# Patient Record
Sex: MALE | Employment: OTHER | URBAN - METROPOLITAN AREA
[De-identification: names, ages, dates, MRNs, and addresses within clinical notes are randomized per-mention and may not be internally consistent; named-entity substitution may affect disease eponyms.]

---

## 2017-01-24 ENCOUNTER — TELEPHONE (OUTPATIENT)
Dept: UROLOGY | Facility: CLINIC | Age: 47
End: 2017-01-24

## 2017-01-24 NOTE — TELEPHONE ENCOUNTER
----- Message from Marnie Jose sent at 1/24/2017 10:31 AM CST -----  Contact: pt  _  1st Request  _  2nd Request  _  3rd Request        Who: pt    Why: pt wants to reschedule his vasectomy that was for today.    What Number to Call Back:[phone]    When to Expect a call back: (Before the end of the day)   -- if call after 3:00 call back will be tomorrow.

## 2017-03-09 ENCOUNTER — TELEPHONE (OUTPATIENT)
Dept: UROLOGY | Facility: CLINIC | Age: 47
End: 2017-03-09

## 2017-03-09 NOTE — TELEPHONE ENCOUNTER
Spoke with pt he states that he had an interview and was unable to make his scheduled procedure. Pt states he was unable to reschedule at time but he will call back to reschedule.

## 2019-03-20 ENCOUNTER — OFFICE VISIT (OUTPATIENT)
Dept: URGENT CARE | Facility: CLINIC | Age: 49
End: 2019-03-20
Payer: OTHER MISCELLANEOUS

## 2019-03-20 VITALS
TEMPERATURE: 100 F | RESPIRATION RATE: 18 BRPM | BODY MASS INDEX: 30.8 KG/M2 | DIASTOLIC BLOOD PRESSURE: 75 MMHG | SYSTOLIC BLOOD PRESSURE: 134 MMHG | WEIGHT: 220 LBS | OXYGEN SATURATION: 99 % | HEIGHT: 71 IN | HEART RATE: 106 BPM

## 2019-03-20 DIAGNOSIS — T78.40XA ALLERGIC REACTION, INITIAL ENCOUNTER: Primary | ICD-10-CM

## 2019-03-20 DIAGNOSIS — L50.9 HIVES OF UNKNOWN ORIGIN: ICD-10-CM

## 2019-03-20 DIAGNOSIS — Z56.89 WORK-RELATED CONDITION: ICD-10-CM

## 2019-03-20 PROCEDURE — 99204 OFFICE O/P NEW MOD 45 MIN: CPT | Mod: 25,S$GLB,, | Performed by: SURGERY

## 2019-03-20 PROCEDURE — 96372 PR INJECTION,THERAP/PROPH/DIAG2ST, IM OR SUBCUT: ICD-10-PCS | Mod: S$GLB,,, | Performed by: SURGERY

## 2019-03-20 PROCEDURE — 99204 PR OFFICE/OUTPT VISIT, NEW, LEVL IV, 45-59 MIN: ICD-10-PCS | Mod: 25,S$GLB,, | Performed by: SURGERY

## 2019-03-20 PROCEDURE — 96372 THER/PROPH/DIAG INJ SC/IM: CPT | Mod: S$GLB,,, | Performed by: SURGERY

## 2019-03-20 RX ORDER — CETIRIZINE HYDROCHLORIDE 10 MG/1
10 TABLET ORAL DAILY
Qty: 30 TABLET | Refills: 0 | Status: SHIPPED | OUTPATIENT
Start: 2019-03-20 | End: 2020-03-19

## 2019-03-20 RX ORDER — DIPHENHYDRAMINE HYDROCHLORIDE 50 MG/ML
50 INJECTION INTRAMUSCULAR; INTRAVENOUS
Status: COMPLETED | OUTPATIENT
Start: 2019-03-20 | End: 2019-03-20

## 2019-03-20 RX ORDER — DIPHENHYDRAMINE HCL 50 MG
50 CAPSULE ORAL EVERY 4 HOURS PRN
Qty: 30 CAPSULE | Refills: 0 | Status: SHIPPED | OUTPATIENT
Start: 2019-03-20

## 2019-03-20 RX ORDER — METHYLPREDNISOLONE 4 MG/1
TABLET ORAL
Qty: 1 PACKAGE | Refills: 0 | Status: SHIPPED | OUTPATIENT
Start: 2019-03-20

## 2019-03-20 RX ADMIN — DIPHENHYDRAMINE HYDROCHLORIDE 50 MG: 50 INJECTION INTRAMUSCULAR; INTRAVENOUS at 08:03

## 2019-03-20 NOTE — LETTER
[facility] [place] [address], [place] [address]  Phone: [phone]  Fax: [phone]  [facility]: [phone]    Pt Name: Chin Jaime Date: 03/20/2019   Employee ID:  Date of First Treatment: 03/20/2019   Company: [employer] to [employer]       Appointment Time: 07:25 PM Arrived: 7:55   Provider: [facility] Time Out:8:41     Office Treatment:   1. Allergic reaction, initial encounter      Medications Ordered This Encounter   Medications    cetirizine (ZYRTEC) 10 MG tablet    diphenhydrAMINE (BENADRYL) 50 MG capsule    diphenhydrAMINE injection 50 mg IM in clinic    methylPREDNISolone (MEDROL DOSEPACK) 4 mg tablet    methylPREDNISolone sod suc(PF) injection 125 mg IM in clinic    ranitidine (ZANTAC) 150 MG tablet        Return to work 3/22/2019 with no restrictions, full duty. Do not take benadryl prior to driving or working. Finish steroid pack, continue ranitidine and cetirizine.            Return Appointment: Follow up as needed with [facility]       Dr Sharita Traore MD

## 2019-03-21 NOTE — PATIENT INSTRUCTIONS
General Allergic Reactions  An allergic reaction is a set of symptoms caused by an allergen. An allergen is something that causes a persons immune system to react. When a person comes in contact with an allergen, it causes the body to release chemicals. These include the chemical histamine. Histamine causes swelling and itching. It may affect the entire body. This is called a general allergic reaction. Often symptoms affect only 1 part of the body. This is called a local allergic reaction.  You are having an allergic reaction. Almost anything can cause one. Different people are allergic to different things. It is usually something that you ate or swallowed, came into contact with by getting or putting it on your skin or clothes, or something you breathed in the air. This can be very annoying and sometimes scary.  Most of us think of allergic reactions when we have a rash or itchy skin. Symptoms can include:  · Itching of the eyes, nose, and roof of the mouth  · Runny or stuffy nose  · Watery eyes   · Sneezing or coughing   · A blocked feeling in the ear  · Red, itchy rash called hives  · Red and purple spots  · Rash, redness, welts, blisters  · Itching, burning, stinging, pain  · Dry, flaky, cracking, scaly skin  Severe symptoms include:  · Swelling of the face, lips, or other parts of the body  · Hoarse voice  · Trouble swallowing, feeling like your throat is closing  · Trouble breathing, wheezing  · Nausea, vomiting, diarrhea, stomach cramps  · Feeling faint or lightheaded, rapid heart rate  Sometimes the cause may be obvious. But there are so many things that can cause a reaction that you may not be able to figure out. The most important things to help find your allergen are:  · Remembering when it started  · What you were doing at the time or just before that  · Any activities you were involved in  · Any new products or contacts  Below are some common causes. But remember that almost anything can cause a  reaction. You may not even be aware that you came into contact with one of these things:  · Dust, mold, pollen  · Plants (common ones are poison ivy and poison oak, but there are many others)   · Animals  · Foods such as shrimp, shellfish, peanuts, milk products, gluten, and eggs. Also food colorings, flavorings, and additives.  · Insect bites or stings such as bees, mosquitos, fleas, ticks  · Medicines such as penicillin, sulfa medicines, amoxicillin, aspirin, and ibuprofen. But any medicine can cause a reaction.  · Jewelry such as nickel or gold. This can be new, or something youve worn for a while, including zippers and buttons.  · Latex such as in gloves, clothes, toys, balloons, or some tapes. Some people allergic to latex may also have problems with foods like bananas, avocados, kiwi, papaya, or chestnuts.  · Lotions, perfumes, cosmetics, soaps, shampoos, skincare products, nail products  · Chemicals or dyes in clothing, linen, , hair dyes, soaps, iodine  Many viruses and common colds can cause a rash that is not an allergic reaction. Sometimes it is hard to tell the difference between allergies, sensitivity, or an intolerance to something. This is especially true with food. Many things can cause diarrhea, vomiting, stomach cramps, and skin irritation.  Home care    The goal of treatment is to help relieve the symptoms and get you feeling better. The rash will usually fade over several days. But it can sometimes last a couple of weeks. Over the next couple of days, there may be times when it is gets a little worse, and then better again. Here are some things to do:  · If you know what you are allergic to, stay away from it. Future reactions could be worse than this one.  · Avoid tight clothing and anything that heats up your skin (hot showers or baths, direct sunlight). Heat will make itching worse.  · An ice pack will relieve local areas of intense itching and redness. To make an ice pack, put ice  cubes in a plastic bag that seals at the top. Wrap it in a thin, clean towel. Dont put the ice directly on the skin because it can damage the skin.  · Oral diphenhydramine is an over-the-counter antihistamine sold at pharmacy and grocery stores. Unless a prescription antihistamine was given, diphenhydramine may be used to reduce itching if large areas of the skin are involved. It may make you sleepy. So be careful using it in the daytime or when going to school, working, or driving. Note: Dont use diphenhydramine if you have glaucoma or if you are a man with trouble urinating due to an enlarged prostate. There are other antihistamines that wont make you so sleepy. These are good choices for daytime use. Ask your pharmacist for suggestions.  · Dont use diphenhydramine cream on your skin. It can cause a further reaction in some people.  · To help prevent an infection, don't scratch the affected area. Scratching may worsen the reaction and damage your skin. It can also lead to an infection. Always check the affected for signs of an infection.  · Call your healthcare provider and ask what you can use to help decrease the itching.  · To decrease allergic reactions, try the following:    · Use heat-steam to clean your home  · Use high-efficiency particulate (HEPA) vacuums and filters  · Stay away from food and pet triggers  · Kill any cockroaches  · Clean your house often  Follow-up care  Follow up with your healthcare provider, or as advised. If you had a severe reaction today, or if you have had several mild to medium allergic reactions in the past, ask your provider about allergy testing. This can help you find out what you are allergic to. If your reaction included dizziness, fainting, or trouble breathing or swallowing, ask your provider about carrying auto-injectable epinephrine.  Call 911  Call 911 if any of these occur:  · Trouble breathing or swallowing, wheezing  · Cool, moist, pale skin  · Shortness of  breath  · Hoarse voice or trouble speaking  · Confused   · Very drowsy or trouble awakening  · Fainting or loss of consciousness  · Rapid heart rate  · Feeling of dizziness or weakness or a sudden drop in blood pressure  · Feeling of doom  · Feeling lightheaded  · Severe nausea or vomiting, or diarrhea  · Seizure  · Swelling in the face, eyelids, lips, mouth, throat or tongue  · Drooling  When to seek medical advice  Call your healthcare provider right away if any of these occur:  · Spreading areas of itching, redness or swelling  · Nausea or stomach cramps or abdominal pain  · Continuing or recurring symptoms  · Spreading areas of redness, swelling, or itching  · Signs of infection at the affected site:  ¨ Spreading redness  ¨ Increased pain or swelling  ¨ Fluid or colored drainage from the site  ¨ Fever of 100.4°F (38°C) or above lasting for 24 to 48 hours, or as directed by your provider  Date Last Reviewed: 3/1/2017  © 2751-6960 [employer]. [address] [place] [address], [place] [address]. All rights reserved. This information is not intended as a substitute for professional medical care. Always follow your healthcare professional's instructions.

## 2019-03-21 NOTE — PROGRESS NOTES
"Subjective:       Patient ID: Chin Birch is a 48 y.o. male.    Vitals:  height is 5' 11" (1.803 m) and weight is 99.8 kg (220 lb). His temperature is 100 °F (37.8 °C). His blood pressure is 134/75 and his pulse is 106. His respiration is 18 and oxygen saturation is 99%.     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  Felt that his skin was developing swelling and a rash as well.  He denied wheezing or shortness of breath although he felt that his throat was tight and that his face is was somewhat numb feeling.       Allergic Reaction   This is a new problem. The current episode started today. The problem has been gradually worsening since onset. The problem is moderate. It is unknown what he was exposed to. The exposure occurred at work. Associated symptoms include a rash. Pertinent negatives include no chest pain, coughing, diarrhea or vomiting. Past treatments include nothing. The treatment provided no relief. There is no history of seasonal allergies. There is no swelling present.       Constitution: Negative for chills, fatigue and fever.   HENT: Negative for congestion and sore throat.    Neck: Negative for painful lymph nodes.   Cardiovascular: Negative for chest pain and leg swelling.   Eyes: Negative for double vision and blurred vision.   Respiratory: Negative for cough and shortness of breath.    Gastrointestinal: Negative for nausea, vomiting and diarrhea.   Genitourinary: Negative for dysuria, frequency and urgency.   Musculoskeletal: Negative for joint pain, joint swelling, muscle cramps and muscle ache.   Skin: Positive for rash and hives. Negative for color change, pale and erythema.   Allergic/Immunologic: Positive for hives. Negative for seasonal allergies. "   Neurological: Negative for dizziness, history of vertigo, light-headedness, passing out and headaches.   Hematologic/Lymphatic: Negative for swollen lymph nodes, easy bruising/bleeding and history of blood clots. Does not bruise/bleed easily.   Psychiatric/Behavioral: Negative for nervous/anxious, sleep disturbance and depression. The patient is not nervous/anxious.        Objective:      Physical Exam   Constitutional: He is oriented to person, place, and time. He appears well-developed and well-nourished.   HENT:   Head: Normocephalic and atraumatic. Head is without abrasion, without contusion and without laceration.   Right Ear: External ear normal.   Left Ear: External ear normal.   Nose: Nose normal.   Mouth/Throat: Oropharynx is clear and moist.   Eyes: Conjunctivae, EOM and lids are normal. Pupils are equal, round, and reactive to light.   Neck: Trachea normal, full passive range of motion without pain and phonation normal. Neck supple.   Cardiovascular: Normal rate, regular rhythm and normal heart sounds.   Pulmonary/Chest: Effort normal and breath sounds normal. No stridor. No respiratory distress.   Musculoskeletal: Normal range of motion.   Neurological: He is alert and oriented to person, place, and time.   Skin: Skin is warm, dry and intact. Capillary refill takes less than 2 seconds. Rash noted. No abrasion, no bruising, no burn, no ecchymosis, no laceration and no lesion noted. Rash is maculopapular. No erythema.   Diffuse pruritis and erythema, worse on face, chest and arms. Irregular papules on inner arms.    Psychiatric: He has a normal mood and affect. His speech is normal and behavior is normal. Judgment and thought content normal. Cognition and memory are normal.   Nursing note and vitals reviewed.        Noted receding of some of the bright erythema of his chest.  Lessening the itching.  Patient more comfortable and not in distress after steroid and Benadryl injection.  Assessment:       1.  Allergic reaction, initial encounter    2. Hives of unknown origin        Plan:         Allergic reaction, initial encounter  -     methylPREDNISolone sod suc(PF) injection 125 mg  -     diphenhydrAMINE injection 50 mg  -     methylPREDNISolone (MEDROL DOSEPACK) 4 mg tablet; use as directed  Dispense: 1 Package; Refill: 0  -     diphenhydrAMINE (BENADRYL) 50 MG capsule; Take 1 capsule (50 mg total) by mouth every 4 (four) hours as needed for Itching (rash/allergic reaction).  Dispense: 30 capsule; Refill: 0  -     cetirizine (ZYRTEC) 10 MG tablet; Take 1 tablet (10 mg total) by mouth once daily.  Dispense: 30 tablet; Refill: 0  -     ranitidine (ZANTAC) 150 MG tablet; Take 1 tablet (150 mg total) by mouth 2 (two) times daily. Take with diclofenac to prevent stomach ulcers and gastritis  Dispense: 60 tablet; Refill: 0    Hives of unknown origin    Return to full duty Friday 3/22/2019. Do not take benadryl before going to work or before driving.       The patient called his sister for a ride home and was discharged once improved.

## 2024-08-11 ENCOUNTER — HOSPITAL ENCOUNTER (EMERGENCY)
Facility: HOSPITAL | Age: 54
Discharge: HOME | End: 2024-08-11
Attending: EMERGENCY MEDICINE

## 2024-08-11 VITALS
HEART RATE: 74 BPM | HEIGHT: 71 IN | TEMPERATURE: 98 F | WEIGHT: 260 LBS | RESPIRATION RATE: 18 BRPM | OXYGEN SATURATION: 98 % | DIASTOLIC BLOOD PRESSURE: 77 MMHG | SYSTOLIC BLOOD PRESSURE: 128 MMHG | BODY MASS INDEX: 36.4 KG/M2

## 2024-08-11 DIAGNOSIS — M79.672 LEFT FOOT PAIN: ICD-10-CM

## 2024-08-11 DIAGNOSIS — L03.90 CELLULITIS, UNSPECIFIED CELLULITIS SITE: Primary | ICD-10-CM

## 2024-08-11 DIAGNOSIS — M79.675 TOE PAIN, LEFT: ICD-10-CM

## 2024-08-11 DIAGNOSIS — R07.9 CHEST PAIN: ICD-10-CM

## 2024-08-11 LAB
ALBUMIN SERPL-MCNC: 4 G/DL (ref 3.3–5.5)
ALP SERPL-CCNC: 57 U/L (ref 42–141)
BILIRUB SERPL-MCNC: 1.3 MG/DL (ref 0.2–1.6)
BUN SERPL-MCNC: 14 MG/DL (ref 7–22)
CALCIUM SERPL-MCNC: 10 MG/DL (ref 8–10.3)
CHLORIDE SERPL-SCNC: 105 MMOL/L (ref 98–108)
CREAT SERPL-MCNC: 1.4 MG/DL (ref 0.6–1.2)
GLUCOSE SERPL-MCNC: 104 MG/DL (ref 73–118)
HCT, POC: NORMAL
HGB, POC: NORMAL (ref 14–18)
MCH, POC: NORMAL
MCHC, POC: NORMAL
MCV, POC: NORMAL
MPV, POC: NORMAL
OHS QRS DURATION: 88 MS
OHS QTC CALCULATION: 421 MS
POC ALT (SGPT): 50 U/L (ref 10–47)
POC AST (SGOT): 51 U/L (ref 11–38)
POC D-DI: 545 NG/ML (ref 0–450)
POC PLATELET COUNT: NORMAL
POC PTINR: 1.1 (ref 0.9–1.2)
POC PTWBT: 13.4 SEC (ref 9.7–14.3)
POC TCO2: 31 MMOL/L (ref 18–33)
POTASSIUM BLD-SCNC: 4.1 MMOL/L (ref 3.6–5.1)
PROTEIN, POC: 7.4 G/DL (ref 6.4–8.1)
RBC, POC: NORMAL
RDW, POC: NORMAL
SAMPLE: NORMAL
SODIUM BLD-SCNC: 140 MMOL/L (ref 128–145)
WBC, POC: NORMAL

## 2024-08-11 PROCEDURE — 85025 COMPLETE CBC W/AUTO DIFF WBC: CPT | Mod: ER

## 2024-08-11 PROCEDURE — 80053 COMPREHEN METABOLIC PANEL: CPT | Mod: ER

## 2024-08-11 PROCEDURE — 93005 ELECTROCARDIOGRAM TRACING: CPT | Mod: ER

## 2024-08-11 PROCEDURE — 93010 ELECTROCARDIOGRAM REPORT: CPT | Mod: ,,, | Performed by: INTERNAL MEDICINE

## 2024-08-11 PROCEDURE — 99285 EMERGENCY DEPT VISIT HI MDM: CPT | Mod: 25,ER

## 2024-08-11 RX ORDER — IBUPROFEN 600 MG/1
600 TABLET ORAL EVERY 6 HOURS
Qty: 20 TABLET | Refills: 0 | Status: SHIPPED | OUTPATIENT
Start: 2024-08-11

## 2024-08-11 RX ORDER — CLINDAMYCIN HYDROCHLORIDE 150 MG/1
450 CAPSULE ORAL 3 TIMES DAILY
Qty: 63 CAPSULE | Refills: 0 | Status: SHIPPED | OUTPATIENT
Start: 2024-08-11 | End: 2024-08-18

## 2024-08-11 RX ORDER — FAMOTIDINE 20 MG/1
20 TABLET, FILM COATED ORAL 2 TIMES DAILY
Qty: 20 TABLET | Refills: 0 | Status: SHIPPED | OUTPATIENT
Start: 2024-08-11 | End: 2024-08-21

## 2024-08-11 NOTE — DISCHARGE INSTRUCTIONS
Your ER work up did not show a broken bone or a blood clot. You may have cellulitis. Take the prescribed antibiotics to treat the infection. Pepcid Take the prescribed motrin to treat pain and swelling. Pepcid will help prevent stomach upset. Elevate foot when you can. Follow up with your doctor later this week.

## 2024-08-11 NOTE — ED PROVIDER NOTES
Encounter Date: 8/11/2024    SCRIBE #1 NOTE: I, Honorio Navarrete Do, am scribing for, and in the presence of,  Tarah Dickinson MD. I have scribed the following portions of the note - Other sections scribed: HPI, ROS, PE.     History     Chief Complaint   Patient presents with    Insect Bite     Patient presents w/ a c/o insect bite to the left 4th digit for approximately a week. Went to  in [place], and prescribed ABX. Stop taking it after the first day due making him nauseous.      Chin Birch is a 54 y.o. male, with a pertinent PMHx of leukemia in remission, who presents to the ED with an area of swelling and discoloration to the left 4th toe and top of the foot. Symptoms began 6 days ago with L 4th toe pain after driving to [place], [place]. He thought an insect may have bitten him but he never saw a bite or sore. He went to  in [place] and was treated with Bactrim, notes non-adherence after 1 dose due to nausea, emesis, and itching. The pain is now resolved but he still has 4th toe swelling and discoloration, along with the discoloration on the foot. No other exacerbating or alleviating factors. Patient denies any associated chest pain, cough, SOB, or calf pain.     The history is provided by the patient. No  was used.     Review of patient's allergies indicates:  No Known Allergies  History reviewed. No pertinent past medical history.  History reviewed. No pertinent surgical history.  Family History   Problem Relation Name Age of Onset    Cancer Mother       Social History     Tobacco Use    Smoking status: Every Day     Current packs/day: 0.25     Types: Cigarettes    Tobacco comments:     once a day   Substance Use Topics    Alcohol use: Yes    Drug use: No     Review of Systems   Constitutional:  Positive for appetite change. Negative for activity change, chills and fever.   HENT:  Negative for congestion, rhinorrhea, sneezing and sore throat.    Respiratory:  Negative for  cough, chest tightness, shortness of breath and wheezing.    Cardiovascular:  Negative for chest pain and palpitations.   Gastrointestinal:  Negative for abdominal pain, diarrhea, nausea and vomiting.   Musculoskeletal:  Positive for arthralgias and joint swelling. Negative for myalgias.   Skin:  Positive for color change. Negative for rash and wound.   Neurological:  Negative for dizziness, light-headedness and headaches.   All other systems reviewed and are negative.      Physical Exam     Initial Vitals [08/11/24 0708]   BP Pulse Resp Temp SpO2   (!) 142/99 85 20 97.9 °F (36.6 °C) 99 %      MAP       --         Physical Exam    Nursing note and vitals reviewed.  Constitutional: He appears well-developed and well-nourished. No distress.   HENT:   Head: Normocephalic and atraumatic.   Eyes: Conjunctivae are normal.   Neck:   Normal range of motion.  Cardiovascular:  Normal rate and regular rhythm.           No murmur heard.  Pulmonary/Chest: Breath sounds normal. No respiratory distress.   Abdominal: Bowel sounds are normal. He exhibits no distension. There is no abdominal tenderness.   Musculoskeletal:         General: No tenderness or edema. Normal range of motion.      Cervical back: Normal range of motion.      Right lower leg: No tenderness.      Left lower leg: No tenderness.      Comments: Swelling to the dorsum of the left 4th toe. There is purple and red discoloration of the toe that extends onto the dorsum of the left foot. No warmth, vesicles, or pustules.      Neurological: He is alert and oriented to person, place, and time.   Skin: Skin is warm and dry. No rash noted.   Psychiatric: He has a normal mood and affect. His behavior is normal.       ED Course   Procedures  Labs Reviewed   POCT CMP - Abnormal       Result Value    Albumin, POC 4.0      Alkaline Phosphatase, POC 57      ALT (SGPT), POC 50 (*)     AST (SGOT), POC 51 (*)     POC BUN 14      Calcium, POC 10.0      POC Chloride 105      POC  Creatinine 1.4 (*)     POC Glucose 104      POC Potassium 4.1      POC Sodium 140      Bilirubin, POC 1.3      POC TCO2 31      Protein, POC 7.4     POCT D DIMER - Abnormal    POC D- (*)    POCT CBC    Hematocrit        Hemoglobin        RBC        WBC        MCV        MCH, POC        MCHC        RDW-CV        Platelet Count, POC        MPV       POCT CMP   POCT D DIMER   POCT PROTIME-INR   ISTAT PROCEDURE    POC PTWBT 13.4      POC PTINR 1.1      Sample unknown       EKG Readings: (Independently Interpreted)   Initial Reading: No STEMI. Rhythm: Normal Sinus Rhythm. Heart Rate: 87. Ectopy: No Ectopy. Conduction: Normal. ST Segments: Normal ST Segments. T Waves: Normal. Clinical Impression: Normal Sinus Rhythm Other Impression: independently interpreted by me       Imaging Results              US Lower Extremity Veins Left (Final result)  Result time 08/11/24 08:28:12      Final result by Donte Alford MD (08/11/24 08:28:12)                   Impression:      No evidence of deep venous thrombosis in the left lower extremity.      Electronically signed by: Donte Alford MD  Date:    08/11/2024  Time:    08:28               Narrative:    EXAMINATION:  US LOWER EXTREMITY VEINS LEFT    CLINICAL HISTORY:  Pain in left toe(s)    TECHNIQUE:  Duplex and color flow Doppler evaluation and graded compression of the left lower extremity veins was performed.    COMPARISON:  None    FINDINGS:  Left thigh veins: The common femoral, femoral, popliteal, upper greater saphenous, and deep femoral veins are patent and free of thrombus. The veins are normally compressible and have normal phasic flow and augmentation response.    Left calf veins: The visualized calf veins are patent.    Right common femoral vein normal.    Miscellaneous: None                                       X-Ray Foot Complete Left (Final result)  Result time 08/11/24 08:55:05      Final result by Donte Alford MD (08/11/24 08:55:05)                    Impression:      As above      Electronically signed by: Donte Alford MD  Date:    08/11/2024  Time:    08:55               Narrative:    EXAMINATION:  XR FOOT COMPLETE 3 VIEW LEFT    CLINICAL HISTORY:  .  Pain in left foot    TECHNIQUE:  AP, lateral and oblique views of the left foot were performed.    COMPARISON:  None    FINDINGS:  Fourth digit soft tissue edema present without radiopaque foreign body or acute osseous abnormality.                                       Medications - No data to display  Medical Decision Making  Chin Birch is a 54 y.o. male, with a pertinent PMHx of leukemia in remission, who presents to the ED with an area of swelling and discoloration to the left 4th toe and top of the foot. Symptoms began 6 days ago with L 4th toe pain after driving to [place], [place]. He thought an insect may have bitten him but he never saw a bite or sore. He went to  in [place] and was treated with Bactrim, notes non-adherence after 1 dose due to nausea, emesis, and itching. The pain is now resolved but he still has 4th toe swelling and discoloration, along with the discoloration on the foot. No other exacerbating or alleviating factors. Patient denies any associated chest pain, cough, SOB, or calf pain.     On exam, swelling to the dorsum of the left 4th toe. There is purple and red discoloration of the toe that extends onto the dorsum of the left foot. No warmth, vesicles, or pustules. In shared decision making with pt, will check labs, xray and ultrasound. Work up with no leukocytosis, no foreign body, no fracture, no soft tissue gas/deep infection, and no DVT. May be cellulitis - will treat with clindamycin with motrin for pain/swelling and pepcid to prevent stomach upset. Pt advised to elevate when possible and follow up with PCP later this week.    Amount and/or Complexity of Data Reviewed  Labs: ordered. Decision-making details documented in ED Course.  Radiology: ordered.  Decision-making details documented in ED Course.  ECG/medicine tests: ordered and independent interpretation performed. Decision-making details documented in ED Course.    Risk  Prescription drug management.    Chin Birch is a 54 y.o. male, with a pertinent PMHx of leukemia on remission, who presents to the ED with an area of swelling to the left 4th toe with associated decreased appetite onset 6 days ago    On exam - Swelling to the dorsum of the left 4th toe. There is purple and red discoloration that extends onto the dorsum of the left foot without increased warmth, vesicles, or pustules.     In shared decision making with pt, I will order labs, x-ray, ultrasound, and an EKG.         Scribe Attestation:   Scribe #1: I performed the above scribed service and the documentation accurately describes the services I performed. I attest to the accuracy of the note.            I, Dr. Tarah Dickinson, personally performed the services described in this documentation.   All medical record entries made by the scribe were at my direction and in my presence.   I have reviewed the chart and agree that the record is accurate and complete.   Tarah Dickinson MD.  7:34 AM 08/11/2024                    Clinical Impression:  Final diagnoses:  [R07.9] Chest pain  [M79.672] Left foot pain  [M79.675] Toe pain, left  [M79.672] Left foot pain - 4th toe  [L03.90] Cellulitis, unspecified cellulitis site (Primary)          ED Disposition Condition    Discharge Stable          ED Prescriptions       Medication Sig Dispense Start Date End Date Auth. Provider    clindamycin (CLEOCIN) 150 MG capsule Take 3 capsules (450 mg total) by mouth 3 (three) times daily. for 7 days 63 capsule 8/11/2024 8/18/2024 Tarah Dickinson MD    ibuprofen (ADVIL,MOTRIN) 600 MG tablet Take 1 tablet (600 mg total) by mouth every 6 (six) hours. 20 tablet 8/11/2024 -- Tarah Dickinson MD    famotidine (PEPCID) 20 MG tablet Take 1 tablet (20 mg total) by mouth 2 (two) times daily.  for 10 days 20 tablet 8/11/2024 8/21/2024 Tarah Dickinson MD          Follow-up Information       Follow up With Specialties Details Why Contact Info    Vel Lipscomb MD Urology  If symptoms worsen [address] [address]  [phone]               Tarah Dickinson MD  08/11/24 0926       Tarah Dickinson MD  08/11/24 0929